# Patient Record
Sex: MALE
[De-identification: names, ages, dates, MRNs, and addresses within clinical notes are randomized per-mention and may not be internally consistent; named-entity substitution may affect disease eponyms.]

---

## 2019-05-18 ENCOUNTER — TRANSCRIPTION ENCOUNTER (OUTPATIENT)
Age: 49
End: 2019-05-18

## 2020-01-22 ENCOUNTER — APPOINTMENT (OUTPATIENT)
Dept: ORTHOPEDIC SURGERY | Facility: CLINIC | Age: 50
End: 2020-01-22
Payer: COMMERCIAL

## 2020-01-22 VITALS
DIASTOLIC BLOOD PRESSURE: 83 MMHG | HEIGHT: 71 IN | BODY MASS INDEX: 29.68 KG/M2 | WEIGHT: 212 LBS | SYSTOLIC BLOOD PRESSURE: 122 MMHG | HEART RATE: 83 BPM

## 2020-01-22 DIAGNOSIS — M75.41 IMPINGEMENT SYNDROME OF RIGHT SHOULDER: ICD-10-CM

## 2020-01-22 DIAGNOSIS — M75.21 BICIPITAL TENDINITIS, RIGHT SHOULDER: ICD-10-CM

## 2020-01-22 PROBLEM — Z00.00 ENCOUNTER FOR PREVENTIVE HEALTH EXAMINATION: Status: ACTIVE | Noted: 2020-01-22

## 2020-01-22 PROCEDURE — 99203 OFFICE O/P NEW LOW 30 MIN: CPT | Mod: 25

## 2020-01-22 PROCEDURE — 73030 X-RAY EXAM OF SHOULDER: CPT | Mod: 26,RT

## 2020-01-22 PROCEDURE — 20610 DRAIN/INJ JOINT/BURSA W/O US: CPT | Mod: RT

## 2020-01-27 DIAGNOSIS — Z78.9 OTHER SPECIFIED HEALTH STATUS: ICD-10-CM

## 2020-01-27 DIAGNOSIS — Z86.39 PERSONAL HISTORY OF OTHER ENDOCRINE, NUTRITIONAL AND METABOLIC DISEASE: ICD-10-CM

## 2020-01-27 RX ORDER — ATORVASTATIN CALCIUM 40 MG/1
40 TABLET, FILM COATED ORAL
Refills: 0 | Status: ACTIVE | COMMUNITY

## 2020-01-28 NOTE — ADDENDUM
[FreeTextEntry1] : This note was written by Lori Rooney on 01/15/2020 acting as a scribe for Tammie Shields, OTKRISTIN/VANESSA, RPA-C\par

## 2020-01-28 NOTE — PHYSICAL EXAM
[de-identified] : Right Shoulder:  \par Range of Motion in Degrees:	\par 	                                  Claimant:	Normal:	\par Abduction (Active)	                  180	               180 degrees	\par Abduction (Passive)	  180	               180 degrees	\par Forward elevation (Active):	  180	               180 degrees	\par Forward elevation (Passive):	  180	               180 degrees	\par External rotation (Active):	   45	               45 degrees	\par External rotation (Passive):	   45	               45 degrees	\par Internal rotation (Active):	   L-1	               L-1	\par Internal rotation (Passive):	   L-1	               L-1	\par  \par No motor weakness to internal rotation, external rotation or abduction in the scapular plane.  Negative crank test.  Negative O’Elver’s test.  Positive Speed’s test. Positive Yergason’s test.  Negative cross arm test.  No tenderness to palpation at the AC joint. Positive Hawkin’s sign.  Positive Neer’s sign. Negative apprehension. Negative sulcus sign.  No gross neurological or vascular deficits distally.  Skin is intact.  No rashes, scars or lesions. 2+ radial and ulnar pulses. No extra-articular swelling or tenderness.\par \par Left Shoulder: \par Range of Motion in Degrees:   	\par    	                        Claimant:  	Normal:  	\par Abduction (Active)  	180  	180 degrees  	\par Abduction (Passive)  	180  	180 degrees  	\par Forward elevation (Active):  	180  	180 degrees  	\par Forward elevation (Passive):  180  	180 degrees  	\par External rotation (Active):  	45  	45 degrees  	\par External rotation (Passive):  	45  	45 degrees  	\par Internal rotation (Active):  	L-1  	L-1  	\par Internal rotation (Passive):  	L-1  	L-1  \par 	\par No motor weakness to internal rotation, external rotation or abduction in the scapular plane.  Negative crank test.  Negative O’Elver’s test.  Negative Speed’s test. Negative Yergason’s test.  Negative cross arm test.  No tenderness to palpation at the AC joint. Negative Hawkin’s sign.  Negative Neer’s sign.  Negative apprehension. Negative sulcus sign.  No gross neurological or vascular deficits distally.  Skin is intact.  No rashes, scars or lesions. 2+ radial and ulnar pulses. No extra-articular swelling or tenderness.\par  [de-identified] : Ambulating with a normal gait.  Station:  Normal. [de-identified] : Appearance:  Well-developed, well-nourished male in no acute distress.\par \par   [de-identified] : Radiographs, two views of the right shoulder, reveals no acute fractures or dislocations noted.

## 2020-01-28 NOTE — HISTORY OF PRESENT ILLNESS
[de-identified] : The patient comes in today complaining of right shoulder pain.  He said he fell approximately one week ago, which most of the brunt went to his hip, but now he is getting significant pain in his right shoulder when he lifts it and in the front.  The patient states the onset/injury occurred on 1/4/20.  This injury is not work related or due to an automobile accident. The patient describes the pain as throbbing/stabbing.  The patient notes ice makes his symptoms better, while using the arm makes his symptoms worse.  The patient indicates pain is at a level of 8 on a pain scale of 0-10.  [7] : the ailment interference is 7/10 [6] : the ailment interference is 6/10 [9] : the ailment interference is 9/10 [5] : the ailment interference is 5/10 [(Does not interfere) 0] : the ailment interference is 0/10 (does not interfere) [] : No

## 2020-01-28 NOTE — PROCEDURE
[de-identified] : Consent: \par At this time, I have recommended an injection to the right shoulder.  The risks and benefits of the procedure were discussed with the patient in detail.  Upon verbal consent of the patient, we proceeded with the injection as noted below.  \par \par Procedure:  \par After a sterile prep, the patient underwent an injection of 9 cc of 1% Lidocaine without epinephrine and 1 cc of Kenalog into the right shoulder.  The patient tolerated the procedure well.  There were no complications.  \par

## 2020-01-28 NOTE — DISCUSSION/SUMMARY
[de-identified] : At this time, due to impingement syndrome as well as biceps tendinitis of the right shoulder, I advised him to ice it.  He will return to the office in a period of one week.  If he is not better, we will discuss an MRI.

## 2020-01-29 ENCOUNTER — APPOINTMENT (OUTPATIENT)
Dept: ORTHOPEDIC SURGERY | Facility: CLINIC | Age: 50
End: 2020-01-29